# Patient Record
Sex: MALE | URBAN - METROPOLITAN AREA
[De-identification: names, ages, dates, MRNs, and addresses within clinical notes are randomized per-mention and may not be internally consistent; named-entity substitution may affect disease eponyms.]

---

## 2017-08-17 ENCOUNTER — IMPORTED ENCOUNTER (OUTPATIENT)
Dept: URBAN - METROPOLITAN AREA CLINIC 1 | Facility: CLINIC | Age: 77
End: 2017-08-17

## 2017-08-17 PROBLEM — Z79.84: Noted: 2017-08-17

## 2017-08-17 PROBLEM — H04.123: Noted: 2017-08-17

## 2017-08-17 PROBLEM — H43.811: Noted: 2017-08-17

## 2017-08-17 PROBLEM — H02.831: Noted: 2017-08-17

## 2017-08-17 PROBLEM — E11.9: Noted: 2017-08-17

## 2017-08-17 PROBLEM — H25.813: Noted: 2017-08-17

## 2017-08-17 PROBLEM — H16.143: Noted: 2017-08-17

## 2017-08-17 PROBLEM — H02.834: Noted: 2017-08-17

## 2017-08-17 PROCEDURE — 92014 COMPRE OPH EXAM EST PT 1/>: CPT

## 2017-08-17 NOTE — PATIENT DISCUSSION
1.  DM Type II without sign of diabetic retinopathy and no blot heme on dilated retinal examination today OU No Macular Edema: Stable. Discussed the pathophysiology of diabetes and its effect on the eye and risk of blindness. Stressed the importance of strong glucose control. Advised of importance of at least yearly dilated examinations but to contact us immediately for any problems or concerns. 2. Type II diabetes controlled by oral medications. 3.  Cataract OU: Observe for now without intervention. The patient was advised to contact us if any change or worsening of vision4. Dermatochalasis OU UL's- Pt does not desire sx at this time. Follow with no intervention at this time. 5. BEBE w/ PEK OU- Stable. The continuation of artificial tears were recommended. 6.  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 RD precautions. 7.  H/o Lasik OU8. Return for an appointment for a a dfe/glare in 6 months with Dr. Susie Jaimes.

## 2018-09-10 ENCOUNTER — IMPORTED ENCOUNTER (OUTPATIENT)
Dept: URBAN - METROPOLITAN AREA CLINIC 1 | Facility: CLINIC | Age: 78
End: 2018-09-10

## 2018-09-10 PROBLEM — E11.9: Noted: 2018-09-10

## 2018-09-10 PROBLEM — Z79.84: Noted: 2018-09-10

## 2018-09-10 PROBLEM — H04.123: Noted: 2018-09-10

## 2018-09-10 PROBLEM — H02.834: Noted: 2018-09-10

## 2018-09-10 PROBLEM — H16.143: Noted: 2018-09-10

## 2018-09-10 PROBLEM — H25.813: Noted: 2018-09-10

## 2018-09-10 PROBLEM — H02.831: Noted: 2018-09-10

## 2018-09-10 PROBLEM — H43.811: Noted: 2018-09-10

## 2018-09-10 PROCEDURE — 92014 COMPRE OPH EXAM EST PT 1/>: CPT

## 2018-09-10 PROCEDURE — 92015 DETERMINE REFRACTIVE STATE: CPT

## 2018-09-10 NOTE — PATIENT DISCUSSION
1.  DM Type II (Oral Meds) -- Without sign of diabetic retinopathy and no blot heme on dilated retinal examination today OU and no Macular Edema OU: Discussed the pathophysiology of diabetes and its effect on the eye and risk of blindness. Stressed the importance of strong glucose control. Advised of importance of at least yearly dilated examinations but to contact us immediately for any problems or concerns. 2. Cataracts OU -- Observe for now without intervention. The patient was advised to contact us if any change or worsening of vision. 3. BEBE w/ PEK OU -- Recommend the frequent use of OTC AT's BID-QID OU. 4. PVD w/o Tear OD -- Old Stable. RD precautions given. 5.  Dermatochalasis OU UL's -- Follow with no intervention at this time. 6. H/o LASIK OU Finalized Glasses MRx & given to patient today. Return for an appointment in 1 YR for a 30 OU with Dr. Dacia Juares.

## 2019-01-10 ENCOUNTER — IMPORTED ENCOUNTER (OUTPATIENT)
Dept: URBAN - METROPOLITAN AREA CLINIC 1 | Facility: CLINIC | Age: 79
End: 2019-01-10

## 2019-01-10 PROBLEM — H02.834: Noted: 2019-01-10

## 2019-01-10 PROBLEM — H43.811: Noted: 2019-01-10

## 2019-01-10 PROBLEM — H04.123: Noted: 2019-01-10

## 2019-01-10 PROBLEM — Z79.84: Noted: 2019-01-10

## 2019-01-10 PROBLEM — H02.831: Noted: 2019-01-10

## 2019-01-10 PROBLEM — E11.9: Noted: 2019-01-10

## 2019-01-10 PROBLEM — H25.813: Noted: 2019-01-10

## 2019-01-10 PROBLEM — H16.143: Noted: 2019-01-10

## 2019-01-10 PROCEDURE — 92015 DETERMINE REFRACTIVE STATE: CPT

## 2019-01-10 PROCEDURE — 92012 INTRM OPH EXAM EST PATIENT: CPT

## 2019-01-10 NOTE — PATIENT DISCUSSION
1.  BEBE w/ PEK OU- Recommend ATs TID OU routinely 2. Cataract OU: Visually significant however patient wishes to observe for now without intervention. The patient was advised to contact us if any change or worsening of vision3. DM Type II (Oral Medication) without sign of diabetic retinopathy and no blot heme on dilated retinal examination today OU No Macular Edema:  Discussed the pathophysiology of diabetes and its effect on the eye and risk of blindness. Stressed the importance of strong glucose control. Advised of importance of at least yearly dilated examinations but to contact us immediately for any problems or concerns. 4. PVD w/o Tear OD - RD precautions. 5.  Dermatochalasis OU UL's  - Follow with no intervention at this time. 6. H/o LASIK OU MRX for glasses givenReturn for an appointment for Return as scheduled with Dr. Joni Schaumann.

## 2019-05-20 ENCOUNTER — IMPORTED ENCOUNTER (OUTPATIENT)
Dept: URBAN - METROPOLITAN AREA CLINIC 1 | Facility: CLINIC | Age: 79
End: 2019-05-20

## 2019-05-20 PROBLEM — H02.831: Noted: 2019-05-20

## 2019-05-20 PROBLEM — H16.143: Noted: 2019-05-20

## 2019-05-20 PROBLEM — H04.123: Noted: 2019-05-20

## 2019-05-20 PROBLEM — H00.12: Noted: 2019-05-20

## 2019-05-20 PROBLEM — H02.834: Noted: 2019-05-20

## 2019-05-20 PROBLEM — H25.813: Noted: 2019-05-20

## 2019-05-20 PROCEDURE — 92012 INTRM OPH EXAM EST PATIENT: CPT

## 2019-05-20 NOTE — PATIENT DISCUSSION
1.  Chalazion right lower eyelid -- Recommend Hot compresses TID x 5 minutes for 3 weeks. If without improvement discussed with patient possible Incision and Drainage procedure. Risks and benefits discussed with patient and patient states full understanding. ([employer] Handout Elva Streeter. BEBE w/ PEK OU -- Recommend ATs TID OU routinely 3. Cataract OU -- Stable. Visually significant however patient wishes to observe for now without intervention. The patient was advised to contact us if any change or worsening of vision. 4. Dermatochalasis OU UL's  - Follow with no intervention at this time. *Consider VF after Phaco*5. H/o PVD w/o Tear OD 6. H/o LASIK OU 7. DM Type II (Oral Medication)Return as scheduled for a 30/glare with Dr. Fitz Ferrer.

## 2019-06-13 ENCOUNTER — IMPORTED ENCOUNTER (OUTPATIENT)
Dept: URBAN - METROPOLITAN AREA CLINIC 1 | Facility: CLINIC | Age: 79
End: 2019-06-13

## 2019-06-13 PROBLEM — H25.813: Noted: 2019-06-13

## 2019-06-13 PROBLEM — H16.143: Noted: 2019-06-13

## 2019-06-13 PROBLEM — H04.123: Noted: 2019-06-13

## 2019-06-13 PROBLEM — H02.831: Noted: 2019-06-13

## 2019-06-13 PROBLEM — H00.032: Noted: 2019-06-13

## 2019-06-13 PROBLEM — H02.834: Noted: 2019-06-13

## 2019-06-13 PROCEDURE — 92012 INTRM OPH EXAM EST PATIENT: CPT

## 2019-06-13 NOTE — PATIENT DISCUSSION
1.  Chalazion abscessed right lower eyelid -- Begin Keflex 500mg PO BID x 1 week. #14 (erx) Hot compresses TID x 5 minutes. If without improvement discussed with patient possible Incision and Drainage procedure. Risks and benefits discussed with patient and patient states full understanding. 2. BEBE w/ PEK OU -- Recommend ATs TID OU routinely 3. Cataract OU -- Stable. Visually significant however patient wishes to observe for now without intervention. The patient was advised to contact us if any change or worsening of vision. 4. Dermatochalasis OU UL's  - Follow with no intervention at this time. *Consider VF after Phaco*5. H/o PVD w/o Tear OD 6. H/o LASIK OU 7. DM Type II (Oral Medication)Return for an appointment in 1 week for a 10/poss I&D with Dr. Kinjal Cyr.

## 2019-06-19 ENCOUNTER — IMPORTED ENCOUNTER (OUTPATIENT)
Dept: URBAN - METROPOLITAN AREA CLINIC 1 | Facility: CLINIC | Age: 79
End: 2019-06-19

## 2019-06-19 PROCEDURE — 99213 OFFICE O/P EST LOW 20 MIN: CPT

## 2019-06-19 NOTE — PATIENT DISCUSSION
1.  Chalazion abscessed right lower eyelid - Will hold on incision and drainage today due to much improved. Conrinue hot compresses BID x 2 weeks and continue Keflex po until gone. If without improvement discussed with patient possible Incision and Drainage procedure. Risks and benefits discussed with patient and patient states full understanding. RTC: as scheduled.

## 2019-06-19 NOTE — PATIENT DISCUSSION
Chalazion right lower eyelid - Hot compresses TID x 5 minutes for 3 weeks. If without improvement discussed with patient possible Incision and Drainage procedure. Risks and benefits discussed with patient and patient states full understanding.

## 2019-06-20 PROBLEM — H00.032: Noted: 2019-06-20

## 2019-06-20 PROBLEM — H00.12: Noted: 2019-06-20

## 2019-09-10 ENCOUNTER — IMPORTED ENCOUNTER (OUTPATIENT)
Dept: URBAN - METROPOLITAN AREA CLINIC 1 | Facility: CLINIC | Age: 79
End: 2019-09-10

## 2019-09-10 PROBLEM — H16.143: Noted: 2019-09-10

## 2019-09-10 PROBLEM — H11.001: Noted: 2019-09-10

## 2019-09-10 PROBLEM — H04.123: Noted: 2019-09-10

## 2019-09-10 PROBLEM — Z79.84: Noted: 2019-09-10

## 2019-09-10 PROBLEM — H02.831: Noted: 2019-09-10

## 2019-09-10 PROBLEM — H01.001: Noted: 2019-09-10

## 2019-09-10 PROBLEM — H43.811: Noted: 2019-09-10

## 2019-09-10 PROBLEM — H01.004: Noted: 2019-09-10

## 2019-09-10 PROBLEM — E11.9: Noted: 2019-09-10

## 2019-09-10 PROBLEM — H25.813: Noted: 2019-09-10

## 2019-09-10 PROBLEM — H02.834: Noted: 2019-09-10

## 2019-09-10 PROCEDURE — 92014 COMPRE OPH EXAM EST PT 1/>: CPT

## 2019-09-10 NOTE — PATIENT DISCUSSION
1.  DM Type II (Oral Medication) without sign of diabetic retinopathy and no blot heme on dilated retinal examination today OU No Macular Edema:  Discussed the pathophysiology of diabetes and its effect on the eye and risk of blindness. Stressed the importance of strong glucose control. Advised of importance of at least yearly dilated examinations but to contact us immediately for any problems or concerns. 2. Cataract OU: Observe for now without intervention. The patient was advised to contact us if any change or worsening of vision3. BEBE w/ PEK OU- Recommend ATs TID OU routinely 4. Anterior Blepharitis OU - Daily Hot compresses and lid scrubs were recommended. 5. Pterygium OD - Use Sunglasses when exposed to UV light. 6.  Dermatochalasis OU UL's  - Follow with no intervention at this time. 7. PVD w/o Tear OD - RD precautions. 8.  H/o LASIK OU  Patient deferred Manifest Rx today. Return for an appointment in 6 months 10/DFE/glare with Dr. Dinorah Manning.

## 2019-10-08 NOTE — PATIENT DISCUSSION
CONJUNCTIVITIS (ALLERGIC), OU:  DISCONTINUE OFLOXACIN AND ZADITOR. PRESCRIBE OLOPATADINE BID OU AS NEEDED FOR RELIEF OF ITCH, AND LOTEMAX SM BID OU FOR UP TO 2 WEEKS (SAMPLE OF LOTEMAX SM PROVIDED). ENCOURAGED USE OF REFRESH TEARS PRN UP TO QID OU AND COOL COMPRESSES. AVOID EYE RUBBING. RETURN FOR FOLLOW-UP AS SCHEDULED.

## 2019-10-08 NOTE — PATIENT DISCUSSION
New Prescription: Lotemax SM (loteprednol etabonate): drops,gel: 0.38% 1 drop twice a day into both eyes 10-

## 2019-10-08 NOTE — PATIENT DISCUSSION
New Prescription: ofloxacin (ofloxacin): drops: 0.3% 1 drop four times a day as directed into affected ear

## 2019-10-08 NOTE — PATIENT DISCUSSION
New Prescription: olopatadine (olopatadine): drops: 0.2% 1 drop twice a day as directed into both eyes 10-

## 2019-11-04 NOTE — PATIENT DISCUSSION
CONJUNCTIVITIS (ALLERGIC), OU:  SYMPTOM RELIEF ACHIEVED WITH OLOPATADINE BID OU AS NEEDED. CONTINUE AS NEEDED. RETURN FOR FOLLOW-UP AS SCHEDULED.

## 2019-11-04 NOTE — PATIENT DISCUSSION
ASTIGMATISM/PRESBYOPIA, OU: PRESCRIBED GLASSES AND CONTACTS. PATIENT TO RETURN FOR CONTACT LENS FITTING INSTRUCT EXAM WITH TECHNICIAN ONCE TRIAL LENSES ARRIVE. DISCUSSED OPTIONS OF DVO WITH READERS, MONOVISION AND MULTIFOCALS AND PATIENT PREFERS TO START WITH MULTIFOCAL OPTION. FOLLOW-UP AS SCHEDULED.

## 2020-03-17 ENCOUNTER — IMPORTED ENCOUNTER (OUTPATIENT)
Dept: URBAN - METROPOLITAN AREA CLINIC 1 | Facility: CLINIC | Age: 80
End: 2020-03-17

## 2020-03-17 PROBLEM — H01.001: Noted: 2020-03-17

## 2020-03-17 PROBLEM — H04.123: Noted: 2020-03-17

## 2020-03-17 PROBLEM — H16.143: Noted: 2020-03-17

## 2020-03-17 PROBLEM — H02.834: Noted: 2020-03-17

## 2020-03-17 PROBLEM — H25.813: Noted: 2020-03-17

## 2020-03-17 PROBLEM — Z79.84: Noted: 2020-03-17

## 2020-03-17 PROBLEM — H02.831: Noted: 2020-03-17

## 2020-03-17 PROBLEM — H43.811: Noted: 2020-03-17

## 2020-03-17 PROBLEM — H11.001: Noted: 2020-03-17

## 2020-03-17 PROBLEM — E11.9: Noted: 2020-03-17

## 2020-03-17 PROBLEM — H01.004: Noted: 2020-03-17

## 2020-03-17 PROCEDURE — 92015 DETERMINE REFRACTIVE STATE: CPT

## 2020-03-17 PROCEDURE — 92012 INTRM OPH EXAM EST PATIENT: CPT

## 2020-03-17 NOTE — PATIENT DISCUSSION
1.  Cataract OU:  Visually Significant secondary to glare  discussed the risks benefits alternatives and limitations of cataract surgery. The patient stated a full understanding and a desire to proceed with the procedure. The patient will need to return for preop appointment with cataract measurements and to have any additional questions answered and start pre-operative eye drops as directed. *Plan Monofocal Possible Toric. **Not MF candidate given patient has had prior LASIK Phaco PCL OS then OD  Otherwise follow-up 6 months 30/glare 2. DM Type II (Oral Medication) without sign of diabetic retinopathy and no blot heme on dilated retinal examination today OU No Macular Edema:  Discussed the pathophysiology of diabetes and its effect on the eye and risk of blindness. Stressed the importance of strong glucose control. Advised of importance of at least yearly dilated examinations but to contact us immediately for any problems or concerns. 3. BEBE w/ PEK OU- Recommend ATs TID OU Routinely 4. Anterior Blepharitis OU - Daily Hot compresses and lid scrubs were recommended. 5. Pterygium OD - Use Sunglasses when exposed to UV light. 6.  Dermatochalasis OU UL's  - Follow with no intervention at this time. Will perform further w/u with Ptosis HVF after Phaco 7. PVD w/o Tear OD - RD precautions. 8.  H/o Monovision LASIK OU - (Neatrour) Probable Previous Hyperope Return for an appointment for Ascan/H and P. with Dr. Jose Schneider.

## 2020-06-02 ENCOUNTER — IMPORTED ENCOUNTER (OUTPATIENT)
Dept: URBAN - METROPOLITAN AREA CLINIC 1 | Facility: CLINIC | Age: 80
End: 2020-06-02

## 2020-06-02 PROBLEM — H25.812: Noted: 2020-06-02

## 2020-06-02 PROCEDURE — 92136 OPHTHALMIC BIOMETRY: CPT

## 2020-06-02 NOTE — PATIENT DISCUSSION
DM Type II (Oral Medication) without sign of diabetic retinopathy and no blot heme on dilated retinal examination today OU No Macular Edema:  Discussed the pathophysiology of diabetes and its effect on the eye and risk of blindness. Stressed the importance of strong glucose control. Advised of importance of at least yearly dilated examinations but to contact us immediately for any problems or concerns. 3. BEBE w/ PEK OU- Recommend ATs TID OU Routinely 4. Anterior Blepharitis OU - Daily Hot compresses and lid scrubs were recommended. 5. Pterygium OD - Use Sunglasses when exposed to UV light. 6.  Dermatochalasis OU UL's  - Follow with no intervention at this time. Will perform further w/u with Ptosis HVF after Phaco 7. PVD w/o Tear OD - RD precautions.  8.  H/o Monovision LASIK OU - (Neatrour) Probable Previous Hyperope

## 2020-06-02 NOTE — PATIENT DISCUSSION
1.  Cataract OS:  Visually Significant secondary to glare discussed the risks benefits alternatives and limitations of cataract surgery. The patient stated a full understanding and a desire to proceed with the procedure. Discussed with patient if PO Gtts are more than $120 for all three combined when filling at their Pharmacy please call our office to request generic substitutions. Guarded visual prognosis due to history of refractive surgery. The accuracy of the IOL selection may be affected by prior refractive surgery and patient should expect to wear glasses after Phaco. Thoroughly discussed with patient patient understood. Phaco PCL OS  Lifestyle Questionnaire Completed. 2.  DM Type II (Oral Medication) without sign of diabetic retinopathy and no blot heme on dilated retinal examination today OU No Macular Edema:  Discussed the pathophysiology of diabetes and its effect on the eye and risk of blindness. Stressed the importance of strong glucose control. Advised of importance of at least yearly dilated examinations but to contact us immediately for any problems or concerns. 3. BEBE w/ PEK OU- Recommend ATs TID OU Routinely 4. Anterior Blepharitis OU - Daily Hot compresses and lid scrubs were recommended. 5. Pterygium OD - Use Sunglasses when exposed to UV light. 6.  Dermatochalasis OU UL's  - Follow with no intervention at this time. Will perform further w/u with Ptosis HVF after Phaco 7. PVD w/o Tear OD - RD precautions. 8.  H/o Monovision LASIK OU - (Neatrour) Probable Previous Hyperope Return for an appointment for Return as scheduled with Dr. Susie Jaimes.

## 2020-06-10 ENCOUNTER — IMPORTED ENCOUNTER (OUTPATIENT)
Dept: URBAN - METROPOLITAN AREA CLINIC 1 | Facility: CLINIC | Age: 80
End: 2020-06-10

## 2020-06-11 ENCOUNTER — IMPORTED ENCOUNTER (OUTPATIENT)
Dept: URBAN - METROPOLITAN AREA CLINIC 1 | Facility: CLINIC | Age: 80
End: 2020-06-11

## 2020-06-11 PROBLEM — Z96.1: Noted: 2020-06-11

## 2020-06-11 PROCEDURE — 99024 POSTOP FOLLOW-UP VISIT: CPT

## 2020-06-11 NOTE — PATIENT DISCUSSION
POD#1 CE/IOL OS doing well. 1 gtt Combigan applied prior to leaving. Begin Combigan BID OS (Sample given). Use Prednisolone BID OS Prolensa Qdaily OS Ocuflox TID OS: Use all three gtts through completion of PO gtt chart regimen/ Per our instructions given to patient.   Post op Warnings Reiterated RTC as scheduled

## 2020-06-18 ENCOUNTER — IMPORTED ENCOUNTER (OUTPATIENT)
Dept: URBAN - METROPOLITAN AREA CLINIC 1 | Facility: CLINIC | Age: 80
End: 2020-06-18

## 2020-06-18 PROBLEM — H25.811: Noted: 2020-06-18

## 2020-06-18 PROCEDURE — 92136 OPHTHALMIC BIOMETRY: CPT

## 2020-07-09 ENCOUNTER — IMPORTED ENCOUNTER (OUTPATIENT)
Dept: URBAN - METROPOLITAN AREA CLINIC 1 | Facility: CLINIC | Age: 80
End: 2020-07-09

## 2020-07-09 PROBLEM — Z96.1: Noted: 2020-07-09

## 2020-07-09 PROCEDURE — 99024 POSTOP FOLLOW-UP VISIT: CPT

## 2020-07-09 NOTE — PATIENT DISCUSSION
1. POD#1 Phaco/ PCL Toric OD- doing well. Some edema noted. *H/o LASIK Use Prednisolone BID OD Prolensa Qdaily OD Ocuflox TID OD -- Use all three gtts through completion of PO gtt chart regimen/ Per our instructions given. Post op Warnings Reiterated 2. POW#3 Phaco/ PCL Toric OS- doing well. *H/o LASIK Use Prednisolone BID OS && Prolensa Qdaily OS -- Use through completion of po gtt regimen.  RTC as scheduled

## 2020-08-04 ENCOUNTER — IMPORTED ENCOUNTER (OUTPATIENT)
Dept: URBAN - METROPOLITAN AREA CLINIC 1 | Facility: CLINIC | Age: 80
End: 2020-08-04

## 2020-08-04 PROBLEM — Z96.1: Noted: 2020-08-04

## 2020-08-04 PROCEDURE — 99024 POSTOP FOLLOW-UP VISIT: CPT

## 2020-08-04 NOTE — PATIENT DISCUSSION
POM#1 CE/IOL OU (Toric OU) doing well. *H/o LASIK OU  Use Prednisolone BID OD & Prolensa Qdaily OD: Use through completion of po gtt regimen. MRX for glasses given. Return for an appointment in 3 months 30/Ptosis HVF with Dr. Pamela Marte.

## 2020-11-17 ENCOUNTER — IMPORTED ENCOUNTER (OUTPATIENT)
Dept: URBAN - METROPOLITAN AREA CLINIC 1 | Facility: CLINIC | Age: 80
End: 2020-11-17

## 2020-11-17 PROBLEM — H26.493: Noted: 2020-11-17

## 2020-11-17 PROBLEM — Z79.84: Noted: 2020-11-17

## 2020-11-17 PROBLEM — H02.831: Noted: 2020-11-17

## 2020-11-17 PROBLEM — Z96.1: Noted: 2020-11-17

## 2020-11-17 PROBLEM — H11.001: Noted: 2020-11-17

## 2020-11-17 PROBLEM — H01.001: Noted: 2020-11-17

## 2020-11-17 PROBLEM — E11.9: Noted: 2020-11-17

## 2020-11-17 PROBLEM — H16.143: Noted: 2020-11-17

## 2020-11-17 PROBLEM — H43.811: Noted: 2020-11-17

## 2020-11-17 PROBLEM — H04.123: Noted: 2020-11-17

## 2020-11-17 PROBLEM — H01.004: Noted: 2020-11-17

## 2020-11-17 PROBLEM — H02.834: Noted: 2020-11-17

## 2020-11-17 PROCEDURE — 92014 COMPRE OPH EXAM EST PT 1/>: CPT

## 2020-11-17 NOTE — PATIENT DISCUSSION
1.  DM Type II (Oral Medication) without sign of diabetic retinopathy and no blot heme on dilated retinal examination today OU No Macular Edema:  Discussed the pathophysiology of diabetes and its effect on the eye and risk of blindness. Stressed the importance of strong glucose control. Advised of importance of at least yearly dilated examinations but to contact us immediately for any problems or concerns. 2. Dermatochalasis OU UL's  - HVF does not qualify pe rinsurance guidelines. Follow with no intervention at this time. 3. BEBE w/ PEK OU- Recommend ATs TID OU routinely 4. PCO OU: (Posterior Capsule Opacification)   Observe and consider yag cap when pt feels pco visually significant and visual acuity decreases to appropriate level. 5. Pseudophakia OU - (Toric OU) 6. Anterior Blepharitis OU - Daily Hot compresses and lid scrubs were recommended. 7. Pterygium OD - Use Sunglasses when exposed to UV light. 8.  PVD w/o Tear OD - RD precautions. 9.  H/o Monovision LASIK OU - (Neatrour) Probable Previous Hyperope Patient deferred Manifest Rx today. Return for an appointment in 1 year 27 with Dr. Pamela Marte.

## 2021-11-16 ENCOUNTER — IMPORTED ENCOUNTER (OUTPATIENT)
Dept: URBAN - METROPOLITAN AREA CLINIC 1 | Facility: CLINIC | Age: 81
End: 2021-11-16

## 2021-11-16 PROBLEM — E11.9: Noted: 2021-11-16

## 2021-11-16 PROBLEM — H16.143: Noted: 2021-11-16

## 2021-11-16 PROBLEM — H04.123: Noted: 2021-11-16

## 2021-11-16 PROBLEM — Z79.84: Noted: 2021-11-16

## 2021-11-16 PROCEDURE — 99214 OFFICE O/P EST MOD 30 MIN: CPT

## 2021-11-16 NOTE — PATIENT DISCUSSION
1.  DM Type II (Oral Medication) without sign of diabetic retinopathy and no blot heme on dilated retinal examination today OU No Macular Edema:  Discussed the pathophysiology of diabetes and its effect on the eye and risk of blindness. Stressed the importance of strong glucose control. Advised of importance of at least yearly dilated examinations but to contact us immediately for any problems or concerns. 2. BEBE w/ PEK OU -- Recommend ATs TID OU routinely 3. Dermatochalasis OU UL's -- previous HVF does not qualify per insurance guidelines. Follow with no intervention at this time. 4. PCO OU (Posterior Capsule Opacification) -- Observe and consider yag cap when pt feels pco visually significant and visual acuity decreases to appropriate level. 5. Pseudophakia OU -- (Toric OU) 6. Anterior Blepharitis OU -- Daily Hot compresses and lid scrubs were recommended. 7. Pterygium OD -- Use Sunglasses when exposed to UV light. 8.  PVD w/o Tear OD -- RD precautions. 9.  H/o Monovision LASIK OU -- (Neatrour) Probable Previous HyperopePatient deferred Manifest Rx today. Return for an appointment in 1 year 27 with Dr. Avis Harley.

## 2022-04-02 ASSESSMENT — TONOMETRY
OD_IOP_MMHG: 17
OD_IOP_MMHG: 16
OD_IOP_MMHG: 13
OD_IOP_MMHG: 16
OD_IOP_MMHG: 13
OS_IOP_MMHG: 16
OD_IOP_MMHG: 13
OS_IOP_MMHG: 16
OD_IOP_MMHG: 14
OS_IOP_MMHG: 15
OS_IOP_MMHG: 19
OS_IOP_MMHG: 16
OS_IOP_MMHG: 16
OS_IOP_MMHG: 13
OS_IOP_MMHG: 14
OS_IOP_MMHG: 14
OD_IOP_MMHG: 16
OS_IOP_MMHG: 32
OS_IOP_MMHG: 13
OD_IOP_MMHG: 19
OD_IOP_MMHG: 16
OS_IOP_MMHG: 16
OD_IOP_MMHG: 16
OS_IOP_MMHG: 12
OS_IOP_MMHG: 16
OD_IOP_MMHG: 16

## 2022-04-02 ASSESSMENT — VISUAL ACUITY
OD_GLARE: 20/50
OS_SC: J5
OS_CC: 20/60-2
OD_CC: 20/60-2
OD_SC: 20/25
OS_SC: J5
OS_SC: J2
OS_PH: SC 20/20
OD_CC: J1
OS_SC: 20/20-1
OD_CC: 20/70
OS_SC: 20/25
OD_SC: 20/25
OS_SC: J2
OD_SC: 20/40
OD_GLARE: 20/80
OS_CC: 20/60-2
OS_CC: 20/60-2
OS_SC: 20/25
OD_CC: 20/25
OD_CC: J1
OS_SC: 20/25
OD_SC: 20/25
OS_GLARE: 20/80
OS_PH: SC 20/25
OD_SC: J5
OS_GLARE: 20/80
OD_SC: 20/20
OD_GLARE: 20/50
OS_CC: 20/80
OS_CC: 20/40
OD_CC: 20/70
OD_SC: 20/40
OS_CC: 20/20
OS_GLARE: 20/80
OD_GLARE: 20/80
OD_SC: 20/40
OS_CC: J1
OD_CC: J1
OS_CC: 20/30
OS_GLARE: 20/70
OS_CC: 20/50
OD_CC: 20/20-1
OD_SC: J5
OS_CC: J1
OS_CC: 20/70
OS_CC: J1
OD_SC: J2
OD_CC: 20/40
OS_CC: 20/30
OD_SC: 20/25
OD_CC: 20/30
OS_CC: J1
OD_GLARE: 20/80
OD_CC: 20/40
OS_SC: 20/25
OD_CC: 20/25
OS_SC: 20/30
OS_SC: 20/20
OS_GLARE: 20/70
OS_SC: 20/25
OD_CC: 20/60-2
OD_CC: J1
OS_CC: 20/30
OD_GLARE: 20/80
OS_GLARE: 20/80

## 2022-11-18 ENCOUNTER — COMPREHENSIVE EXAM (OUTPATIENT)
Dept: URBAN - METROPOLITAN AREA CLINIC 1 | Facility: CLINIC | Age: 82
End: 2022-11-18

## 2022-11-18 DIAGNOSIS — H26.493: ICD-10-CM

## 2022-11-18 DIAGNOSIS — E11.9: ICD-10-CM

## 2022-11-18 DIAGNOSIS — H01.024: ICD-10-CM

## 2022-11-18 DIAGNOSIS — H43.813: ICD-10-CM

## 2022-11-18 DIAGNOSIS — H16.143: ICD-10-CM

## 2022-11-18 DIAGNOSIS — Z96.1: ICD-10-CM

## 2022-11-18 DIAGNOSIS — H04.123: ICD-10-CM

## 2022-11-18 DIAGNOSIS — H01.021: ICD-10-CM

## 2022-11-18 DIAGNOSIS — H35.033: ICD-10-CM

## 2022-11-18 PROCEDURE — 99214 OFFICE O/P EST MOD 30 MIN: CPT

## 2022-11-18 PROCEDURE — 92015 DETERMINE REFRACTIVE STATE: CPT

## 2022-11-18 ASSESSMENT — VISUAL ACUITY
OS_SC: 20/30
OD_SC: 20/25

## 2022-11-18 ASSESSMENT — KERATOMETRY
OS_AXISANGLE_DEGREES: 092
OS_K1POWER_DIOPTERS: 45.00
OS_AXISANGLE2_DEGREES: 2
OD_K2POWER_DIOPTERS: 44.75
OS_K2POWER_DIOPTERS: 46.25
OD_K1POWER_DIOPTERS: 43.25
OD_AXISANGLE_DEGREES: 107
OD_AXISANGLE2_DEGREES: 17

## 2022-11-18 ASSESSMENT — TONOMETRY
OS_IOP_MMHG: 13
OD_IOP_MMHG: 13

## 2023-11-21 ENCOUNTER — COMPREHENSIVE EXAM (OUTPATIENT)
Dept: URBAN - METROPOLITAN AREA CLINIC 1 | Facility: CLINIC | Age: 83
End: 2023-11-21

## 2023-11-21 DIAGNOSIS — H01.024: ICD-10-CM

## 2023-11-21 DIAGNOSIS — H01.021: ICD-10-CM

## 2023-11-21 DIAGNOSIS — H26.493: ICD-10-CM

## 2023-11-21 DIAGNOSIS — H04.123: ICD-10-CM

## 2023-11-21 DIAGNOSIS — H43.813: ICD-10-CM

## 2023-11-21 DIAGNOSIS — H16.143: ICD-10-CM

## 2023-11-21 DIAGNOSIS — H35.033: ICD-10-CM

## 2023-11-21 DIAGNOSIS — E11.9: ICD-10-CM

## 2023-11-21 PROCEDURE — 99214 OFFICE O/P EST MOD 30 MIN: CPT

## 2023-11-21 ASSESSMENT — VISUAL ACUITY
OD_SC: 20/20-1
OS_SC: 20/25-2

## 2023-11-21 ASSESSMENT — TONOMETRY
OD_IOP_MMHG: 14
OS_IOP_MMHG: 14

## 2024-04-16 ENCOUNTER — EMERGENCY VISIT (OUTPATIENT)
Dept: URBAN - METROPOLITAN AREA CLINIC 1 | Facility: CLINIC | Age: 84
End: 2024-04-16

## 2024-04-16 DIAGNOSIS — H00.14: ICD-10-CM

## 2024-04-16 PROCEDURE — 99213 OFFICE O/P EST LOW 20 MIN: CPT

## 2024-04-16 ASSESSMENT — TONOMETRY
OS_IOP_MMHG: 14
OD_IOP_MMHG: 14

## 2024-04-16 ASSESSMENT — VISUAL ACUITY
OD_SC: 20/20
OS_SC: 20/20-2

## 2024-04-30 ENCOUNTER — CLINIC PROCEDURE ONLY (OUTPATIENT)
Dept: URBAN - METROPOLITAN AREA CLINIC 1 | Facility: CLINIC | Age: 84
End: 2024-04-30

## 2024-04-30 DIAGNOSIS — H00.14: ICD-10-CM

## 2024-04-30 PROCEDURE — 99213 OFFICE O/P EST LOW 20 MIN: CPT

## 2024-05-15 NOTE — PATIENT DISCUSSION
1.  Cataract OD: Visually Significant secondary to glare discussed the risks benefits alternatives and limitations of cataract surgery. The patient stated a full understanding and a desire to proceed with the procedure. Discussed with patient if PO Gtts are more than $120 for all three combined when filling at their Pharmacy please call our office to request generic substitutions. Guarded visual prognosis due to history of refractive surgery. The accuracy of the IOL selection may be affected by prior refractive surgery and patient should expect to wear glasses after Phaco. Thoroughly discussed with patient patient understood. Phaco PCL OD 2. POW#3  CE/IOL OS (Toric) doing well. *H/o LASIK DC Combigan  Use Prednisolone BID OS & Prolensa Qdaily OS: Use through completion of po gtt regimen.  F/u as scheduled 2nd eye
Yes

## 2024-05-17 ENCOUNTER — EMERGENCY VISIT (OUTPATIENT)
Dept: URBAN - METROPOLITAN AREA CLINIC 1 | Facility: CLINIC | Age: 84
End: 2024-05-17

## 2024-05-17 DIAGNOSIS — H01.024: ICD-10-CM

## 2024-05-17 DIAGNOSIS — H00.15: ICD-10-CM

## 2024-05-17 DIAGNOSIS — H00.14: ICD-10-CM

## 2024-05-17 DIAGNOSIS — H01.021: ICD-10-CM

## 2024-05-17 PROCEDURE — 99213 OFFICE O/P EST LOW 20 MIN: CPT

## 2024-05-17 ASSESSMENT — VISUAL ACUITY
OD_SC: 20/20
OS_SC: 20/20-1

## 2024-05-17 ASSESSMENT — TONOMETRY
OD_IOP_MMHG: 15
OS_IOP_MMHG: 15

## 2024-05-21 ENCOUNTER — CLINIC PROCEDURE ONLY (OUTPATIENT)
Dept: URBAN - METROPOLITAN AREA CLINIC 1 | Facility: CLINIC | Age: 84
End: 2024-05-21

## 2024-05-21 DIAGNOSIS — H00.14: ICD-10-CM

## 2024-05-21 DIAGNOSIS — H00.15: ICD-10-CM

## 2024-05-21 PROCEDURE — 67800 REMOVE EYELID LESION: CPT

## 2024-11-19 ENCOUNTER — COMPREHENSIVE EXAM (OUTPATIENT)
Dept: URBAN - METROPOLITAN AREA CLINIC 1 | Facility: CLINIC | Age: 84
End: 2024-11-19

## 2024-11-19 DIAGNOSIS — Z98.890: ICD-10-CM

## 2024-11-19 DIAGNOSIS — Z96.1: ICD-10-CM

## 2024-11-19 DIAGNOSIS — H01.021: ICD-10-CM

## 2024-11-19 DIAGNOSIS — E11.9: ICD-10-CM

## 2024-11-19 DIAGNOSIS — H35.033: ICD-10-CM

## 2024-11-19 DIAGNOSIS — H01.024: ICD-10-CM

## 2024-11-19 DIAGNOSIS — H16.143: ICD-10-CM

## 2024-11-19 DIAGNOSIS — H43.813: ICD-10-CM

## 2024-11-19 DIAGNOSIS — H26.493: ICD-10-CM

## 2024-11-19 DIAGNOSIS — H04.123: ICD-10-CM

## 2024-11-19 PROCEDURE — 99214 OFFICE O/P EST MOD 30 MIN: CPT
